# Patient Record
Sex: FEMALE | Race: WHITE | HISPANIC OR LATINO | Employment: FULL TIME | URBAN - METROPOLITAN AREA
[De-identification: names, ages, dates, MRNs, and addresses within clinical notes are randomized per-mention and may not be internally consistent; named-entity substitution may affect disease eponyms.]

---

## 2017-03-13 ENCOUNTER — HOSPITAL ENCOUNTER (OUTPATIENT)
Dept: GENERAL RADIOLOGY | Facility: HOSPITAL | Age: 42
Discharge: HOME | End: 2017-03-13
Attending: INTERNAL MEDICINE | Admitting: INTERNAL MEDICINE

## 2017-03-13 ENCOUNTER — TRANSCRIBE ORDERS (OUTPATIENT)
Dept: ADMINISTRATIVE | Facility: HOSPITAL | Age: 42
End: 2017-03-13

## 2017-03-13 DIAGNOSIS — M54.10 RADICULAR LOW BACK PAIN: Primary | ICD-10-CM

## 2017-03-13 DIAGNOSIS — M54.10 RADICULAR LOW BACK PAIN: ICD-10-CM

## 2017-03-13 PROCEDURE — 72120 X-RAY BEND ONLY L-S SPINE: CPT

## 2017-06-28 RX ORDER — HYDROCODONE BITARTRATE AND ACETAMINOPHEN 5; 325 MG/1; MG/1
1 TABLET ORAL EVERY 6 HOURS PRN
COMMUNITY

## 2017-06-28 RX ORDER — CHLORAL HYDRATE 500 MG
CAPSULE ORAL
COMMUNITY

## 2017-06-28 RX ORDER — LORATADINE 10 MG/1
10 TABLET ORAL DAILY
COMMUNITY

## 2017-06-28 RX ORDER — NORETHINDRONE ACETATE AND ETHINYL ESTRADIOL 1; 5 MG/1; UG/1
TABLET ORAL DAILY
COMMUNITY

## 2017-06-29 ENCOUNTER — OFFICE VISIT (OUTPATIENT)
Dept: NEUROSURGERY | Facility: CLINIC | Age: 42
End: 2017-06-29

## 2017-06-29 VITALS
WEIGHT: 242 LBS | HEIGHT: 68 IN | DIASTOLIC BLOOD PRESSURE: 83 MMHG | SYSTOLIC BLOOD PRESSURE: 143 MMHG | BODY MASS INDEX: 36.68 KG/M2 | HEART RATE: 66 BPM

## 2017-06-29 DIAGNOSIS — M54.16 LUMBAR RADICULOPATHY: Primary | ICD-10-CM

## 2017-06-29 PROCEDURE — 99243 OFF/OP CNSLTJ NEW/EST LOW 30: CPT | Performed by: NEUROLOGICAL SURGERY

## 2017-06-29 RX ORDER — DICLOFENAC SODIUM 75 MG/1
TABLET, DELAYED RELEASE ORAL
COMMUNITY
Start: 2017-05-09

## 2017-06-29 NOTE — PROGRESS NOTES
Subjective   Patient ID: Elisha Cabral is a 41 y.o. female is being seen for consultation today at the request of Dr. Kristin Cook for back pain that radiates into right lower leg She also has right leg weakness. She currently takes Diclofenac for pain.    History of Present Illness    This patient has been having pain in her back for many years.  The pain will wax and wane but is often times quite severe.  It ranges from her lower back up into her thoracic spine.  She had not really had much pain into her legs until earlier this year when she began to have some radiation into her right leg.  The pain radiates into her right leg in the right lateral calf.  She really doesn't have much pain in her thigh at all.  The left leg is okay and she has no difficulty with bowel and bladder control or other associated symptoms.  The pain is worse with activity.  She did go on vacation not too long ago and when she came back some of the pain in her right lateral leg was improved.     The following portions of the patient's history were reviewed and updated as appropriate: allergies, current medications, past family history, past medical history, past social history, past surgical history and problem list.    Review of Systems   Constitutional: Positive for activity change.   Respiratory: Negative for chest tightness and shortness of breath.    Cardiovascular: Negative for chest pain.   Musculoskeletal: Positive for back pain. Negative for myalgias.        Right lower extremity pain   Neurological: Positive for weakness. Negative for numbness.   All other systems reviewed and are negative.      Objective   Physical Exam   Constitutional: She is oriented to person, place, and time. She appears well-developed and well-nourished.   HENT:   Head: Normocephalic and atraumatic.   Eyes: Conjunctivae and EOM are normal. Pupils are equal, round, and reactive to light.   Fundoscopic exam:       The right eye shows no papilledema. The right  eye shows venous pulsations.        The left eye shows no papilledema. The left eye shows venous pulsations.   Neck: Carotid bruit is not present.   Neurological: She is oriented to person, place, and time. She has a normal Finger-Nose-Finger Test and a normal Heel to Shin Test. Gait normal.   Reflex Scores:       Tricep reflexes are 2+ on the right side and 2+ on the left side.       Bicep reflexes are 2+ on the right side and 2+ on the left side.       Brachioradialis reflexes are 2+ on the right side and 2+ on the left side.       Patellar reflexes are 2+ on the right side and 2+ on the left side.       Achilles reflexes are 2+ on the right side and 2+ on the left side.  Psychiatric: Her speech is normal.     Neurologic Exam     Mental Status   Oriented to person, place, and time.   Registration of memory: Good recent and remote memory.   Attention: normal. Concentration: normal.   Speech: speech is normal   Level of consciousness: alert  Knowledge: consistent with education.     Cranial Nerves     CN II   Visual fields full to confrontation.   Visual acuity: normal    CN III, IV, VI   Pupils are equal, round, and reactive to light.  Extraocular motions are normal.     CN V   Facial sensation intact.   Right corneal reflex: normal  Left corneal reflex: normal    CN VII   Facial expression full, symmetric.   Right facial weakness: none  Left facial weakness: none    CN VIII   Hearing: intact    CN IX, X   Palate: symmetric    CN XI   Right sternocleidomastoid strength: normal  Left sternocleidomastoid strength: normal    CN XII   Tongue: not atrophic  Tongue deviation: none    Motor Exam   Muscle bulk: normal  Right arm tone: normal  Left arm tone: normal  Right leg tone: normal  Left leg tone: normal    Strength   Strength 5/5 except as noted.     Sensory Exam   Light touch normal.     Gait, Coordination, and Reflexes     Gait  Gait: normal    Coordination   Finger to nose coordination: normal  Heel to shin  coordination: normal    Reflexes   Right brachioradialis: 2+  Left brachioradialis: 2+  Right biceps: 2+  Left biceps: 2+  Right triceps: 2+  Left triceps: 2+  Right patellar: 2+  Left patellar: 2+  Right achilles: 2+  Left achilles: 2+  Right : 2+  Left : 2+      Assessment/Plan   Independent Review of Radiographic Studies:      I reviewed an MRI of her lumbar spine done [facility] May of this year.  This does show some disc bulging at T12-L1 to the right side but does not appear to be causing any pressure on the nerves.  L1 2 looks okay as does L2-3.  L3 4 shows some disc bulging centrally but no evidence of any significant pressure on the nerves.  L4 5 shows a right-sided disc herniation that is fairly large.  L5-S1 looks okay for the most part.  I also reviewed a thoracic MRI which for the most part looks okay.    Medical Decision Making:      I told the patient about the imaging.  I think that the disc at L4 5 may be contributing to the problems however she has had 2 previous lumbar surgeries and the findings at L4 5 and may be more scar tissue since the MRI was done without contrast.  Consequently I suggested that before we jump into anything too aggressive we go ahead and try some lumbar epidural blocks and some lumbar PT.  I will have her call me after that and let me now how she is doing.  If she is better than we will do nothing further but if she is not we will get her back into the office to discuss either a follow-up MRI or probably a myelogram.    Elisha was seen today for back pain and leg pain.    Diagnoses and all orders for this visit:    Lumbar radiculopathy  -     Epidural Block  -     Ambulatory Referral to Physical Therapy    Return for Recheck and call after treatment or consultation.

## 2017-07-14 ENCOUNTER — HOSPITAL ENCOUNTER (OUTPATIENT)
Dept: PHYSICAL THERAPY | Facility: HOSPITAL | Age: 42
Setting detail: THERAPIES SERIES
Discharge: HOME | End: 2017-07-14
Attending: NEUROLOGICAL SURGERY

## 2017-07-14 DIAGNOSIS — M54.41 CHRONIC MIDLINE LOW BACK PAIN WITH RIGHT-SIDED SCIATICA: Primary | ICD-10-CM

## 2017-07-14 DIAGNOSIS — G89.29 CHRONIC MIDLINE LOW BACK PAIN WITH RIGHT-SIDED SCIATICA: Primary | ICD-10-CM

## 2017-07-14 PROCEDURE — 97161 PT EVAL LOW COMPLEX 20 MIN: CPT

## 2017-07-14 PROCEDURE — 97012 MECHANICAL TRACTION THERAPY: CPT

## 2017-07-14 PROCEDURE — G8978 MOBILITY CURRENT STATUS: HCPCS

## 2017-07-14 PROCEDURE — G8979 MOBILITY GOAL STATUS: HCPCS

## 2017-07-14 NOTE — THERAPY EVALUATION
Outpatient Physical Therapy Ortho Initial Evaluation  [facility]     Patient Name: Elisha Cabral  : 1975  MRN: [id number]  Today's Date: 2017      Visit Date: 2017    Patient Active Problem List   Diagnosis   • Lumbar radiculopathy        History reviewed. No pertinent past medical history.     Past Surgical History:   Procedure Laterality Date   • BACK SURGERY     • KNEE SURGERY     • NOSE SURGERY         Visit Dx:     ICD-10-CM ICD-9-CM   1. Chronic midline low back pain with right-sided sciatica M54.41 724.2    G89.29 724.3     338.29             Patient History       17 1100          History    Chief Complaint Pain  -LS      Type of Pain Back pain  -LS      Date Current Problem(s) Began 17  -LS      Brief Description of Current Complaint Pt reports discectomy x 2 in  at unknown level. In March of this year began having R lateral leg pain, occasional warmth in R toes, R hip and midline R sided low back pain. It did have one episode of resolving in . On feet for 8 hours and week off led to resolved pain. 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  -LS      Previous treatment for THIS PROBLEM Surgery  -LS      Onset Date- PT 17  -LS      Surgery Date: 03  -LS      Patient/Caregiver Goals Relieve pain;Improve mobility;Improve strength  -LS      Hand Dominance right-handed  -LS      Occupation/sports/leisure activities Pt works at [employer]  -LS      Patient seeing anyone else for problem(s)? Yes  -LS      How has patient tried to help current problem? discectomy   -LS      What clinical tests have you had for this problem? MRI  -LS      Results of Clinical Tests L4/L5 disc herniation  -LS      Surgery/Hospitalization discectomy x2 2003  -LS      Pain     Pain Location Back  -LS      Pain at Present 4  -LS      Pain at Best 6  -LS      Pain at Worst 4  -LS      Pain Frequency Constant/continuous   -LS      Pain Description Aching;Shooting  -LS      What Performance Factors Make the Current Problem(s) WORSE? moving, lifting, standing  -LS      What Performance Factors Make the Current Problem(s) BETTER? resting, hooklying  -LS      Tolerance Time- Standing 5 minutes  -LS      Tolerance Time- Sitting 20 minutes  -LS      Tolerance Time- Walking 5 minutes  -LS      Tolerance Time- Lying 2 hours  -LS      Is your sleep disturbed? Yes  -LS      What position do you sleep in? Right sidelying  -LS      Difficulties at work? Yes, pt stands all day  -LS      Fall Risk Assessment    Any falls in the past year: No  -LS      Services    Prior Rehab/Home Health Experiences No  -LS      Daily Activities    Primary Language English  -LS      Pt Participated in POC and Goals Yes  -LS      Safety    Are you being hurt, hit, or frightened by anyone at home or in your life? No  -LS      Are you being neglected by a caregiver No  -LS        User Key  (r) = Recorded By, (t) = Taken By, (c) = Cosigned By    Initials Name Provider Type    LS Barb Stone, PT Physical Therapist                PT Ortho       07/14/17 1500    Posture/Observations    Posture/Observations Comments forward head, B rounded shoulders, B hip ER in stance and ambulation, dec lumbar lordosis  -LS    Sensation    Additional Comments dec sensation RLE lateral calf and lateral foot  -LS    Myotomal Screen- Lower Quarter Clearing    Hip flexion (L2) Right:;Left:;4+ (Good +);4 (Good)  -LS    Knee extension (L3) Bilateral:;5 (Normal)  -LS    Ankle DF (L4) Right:;Left:;5 (Normal);4+ (Good +)  -LS    Ankle PF (S1) Bilateral:;5 (Normal)  -LS    Knee flexion (S2) Right:;Left:;5 (Normal);4+ (Good +)  -LS    Lumbar ROM Screen- Lower Quarter Clearing    Lumbar Flexion Normal  -LS    Lumbar Extension Impaired   ROM WFL; painful extension  -LS    Lumbar Lateral Flexion Impaired   painful to L  -LS    Lumbar Rotation Impaired   painful to R  -LS    Lumbosacral Palpation     Lumbosacral Segment Tender  -LS    Piriformis Guarded/taut  -LS    Gluteus Phu Guarded/taut  -LS    Quadratus Lumborum Guarded/taut  -LS    Erector Spinae (Paraspinals) Guarded/taut  -LS    Lumbar/SI Special Tests    Stork Test (SI Dysfunction) Negative  -LS    Slump Test (Neural Tension) Positive;Right:  -LS    SLR (Neural Tension) Positive;Right:  -LS    SI Compression Test (SI Dysfunction) Negative  -LS    SI Distraction Test (SI Dysfunction) Negative  -LS    ABBEY (hip vs. SI Dysfunction) Positive;Bilateral:  -LS    ROM (Range of Motion)    General ROM Detail BLE WFL  -LS    MMT (Manual Muscle Testing)    General MMT Assessment Detail B hip abduction 4/5  -LS    Lower Extremity Flexibility    Hamstrings Bilateral:;WNL  -LS    Hip External Rotators Bilateral:;Mildly limited  -LS    Hip Internal Rotators Bilateral:;Mildly limited  -LS    Gastrocnemius Bilateral:;WNL  -LS    Gait Assessment/Treatment    Gait, [place] Level independent  -LS    Gait, Distance (Feet) 100  -LS    Gait, Comment dec B hip extension, B ER, slightly antalgic  -LS      User Key  (r) = Recorded By, (t) = Taken By, (c) = Cosigned By    Initials Name Provider Type    STEVO Stone PT Physical Therapist                            Therapy Education       07/14/17 1502          Therapy Education    Given HEP;Symptoms/condition management;Pain management  -LS      Program New  -LS      How Provided Verbal;Demonstration;Written  -LS      Provided to Patient  -LS      Level of Understanding Teach back education performed;Verbalized;Demonstrated  -LS        User Key  (r) = Recorded By, (t) = Taken By, (c) = Cosigned By    Initials Name Provider Type    STEVO Stone PT Physical Therapist                PT OP Goals       07/14/17 1500       PT Short Term Goals    STG Date to Achieve 07/28/17  -LS     STG 1 Pt will demonstrate understanding and compliance with initial HEP.  -LS     STG 1 Progress New  -LS     STG 2 Pt will report  reduced pain from 6/10 at worse to 4/10 or better with improved core activation and  improvement.  -LS     STG 2 Progress New  -LS     STG 3 Pt will report reduced occurence of R foot burning to demonstrate centralization of symptoms.  -LS     STG 3 Progress New  -LS     Long Term Goals    LTG Date to Achieve 08/13/17  -LS     LTG 1 Pt will report reduced overall disability evidenced by improved Oswestry disability score from 64% to less than or equal to 50% disability.  -LS     LTG 1 Progress New  -LS     LTG 2 Pt will report centralization of symptoms with symptoms to R hip or more proximal demonstrating healing.  -LS     LTG 2 Progress New  -LS     LTG 3 Pt will demonstrate improved R ankle DF and knee flexion strength from 4+/5 to 5/5 to equal LLE.   -LS     LTG 3 Progress New  -LS     Time Calculation    PT Goal Re-Cert Due Date 08/13/17  -LS       User Key  (r) = Recorded By, (t) = Taken By, (c) = Cosigned By    Initials Name Provider Type    LS Babr Stone, PT Physical Therapist                PT Assessment/Plan       07/14/17 7297       PT Assessment    Functional Limitations Limitations in community activities;Performance in sport activities;Impaired gait;Limitations in functional capacity and performance;Performance in work activities;Impaired locomotion;Performance in leisure activities;Limitation in home management;Performance in self-care ADL  -LS     Impairments Impaired reflex integrity;Muscle strength;Range of motion;Pain;Sensation;Joint integrity;Impaired muscle length;Gait;Impaired flexibility;Joint mobility;Poor body mechanics;Locomotion;Motor function;Posture  -LS     Assessment Comments Pt is 41 yo female who works on feet all day at [employer] with occasional heavy lifting requirements reporting several month hx of R sided LBP, hip pain, and radicular symptoms to lateral lower leg. She also has occasional burning feeling in R toes. She demonstrates poor posture with rounded  forward shoulders, B hip ER, and posterior pelvic tilt in standing. She has hx of discectomy x2 in 2003 with reduction of similar symptoms until recently following 50# weight loss. She demonstrates minimal strength deficits R > L with 4/5 hip flexion and 4+/5 knee flexion, ankle DF compared to LLE grossly 5/5. She demonstrates dec light touch sensation RLE lower leg, and lateral foot. B patellar and [place]'s reflexes were diminished (1+). She demonstrates normal ROM in all planes with slight deficit and pain in R rotation and L sidebending. Stork testing, SI compression/distraction were negative. LAD reduced RLE symptoms. SLR and Slump testing were positive on R. She has pain with transitional movements including rolling to R and L, supine to sit, and sit to stand. Hooklying position reduced her pain. She demonstrates signs and symptoms consistent with nerve root irritation. She will benefit from continued skilled PT services in order to improve core strength, improve BLE flexibility, and improve posture to reduce lumbar spine strain with daily tasks.   -LS     Please refer to paper survey for additional self-reported information Yes  -LS     Rehab Potential Good  -LS     Patient/caregiver participated in establishment of treatment plan and goals Yes  -LS     Patient would benefit from skilled therapy intervention Yes  -LS     PT Plan    PT Frequency 2x/week  -LS     Predicted Duration of Therapy Intervention (days/wks) 4 weeks   -LS     Planned CPT's? PT EVAL LOW COMPLEXITY: 69194;PT RE-EVAL: 88000;PT MANUAL THERAPY EA 15 MIN: 67905;PT HOT OR COLD PACK TREAT MCARE;PT ULTRASOUND EA 15 MIN: 60509;PT HOT/COLD PACK WC NONMCARE: 56772;PT THER PROC EA 15 MIN: 98802;PT THER ACT EA 15 MIN: 15517;PT TRACTION LUMBAR: 36407;PT ELECTRICAL STIM UNATTEND:   -LS     PT Plan Comments Assess response to lumbar traction. Continue BLE flexibility, core strengthening, body mechanics education, postural improvement.   -LS        User Key  (r) = Recorded By, (t) = Taken By, (c) = Cosigned By    Initials Name Provider Type    STEVO Stone PT Physical Therapist                Modalities       07/14/17 1500          Subjective Comments    Subjective Comments I lost 50# and then my back started hurting. It doesn't make sense.  -LS      Subjective Pain    Able to rate subjective pain? yes  -LS      Pre-Treatment Pain Level 4  -LS      Subjective Pain Comment When I had a week off work my back got much better.  -LS      Moist Heat    MH Applied Yes  -LS      Location lumbar spine during traction  -LS      Rx Minutes 10 mins  -LS      Traction 12167    Traction Type Lumbar  -LS      Rx Minutes 10  -LS      Duration Intermittent  -LS      Position Hook-lying  -LS      Weight 60   40#  -LS      Hold 40  -LS      Relax 10  -LS        User Key  (r) = Recorded By, (t) = Taken By, (c) = Cosigned By    Initials Name Provider Type    STEVO Stone PT Physical Therapist              Exercises       07/14/17 1500          Subjective Comments    Subjective Comments I lost 50# and then my back started hurting. It doesn't make sense.  -LS      Subjective Pain    Able to rate subjective pain? yes  -LS      Pre-Treatment Pain Level 4  -LS      Subjective Pain Comment When I had a week off work my back got much better.  -LS      Exercise 1    Exercise Name 1 SKTC  -LS      Exercise 2    Exercise Name 2 piriformis stretch  -LS      Exercise 3    Exercise Name 3 TA activation  -LS      Exercise 4    Exercise Name 4 decompression position  -LS      Exercise 5    Exercise Name 5 sciatic nerve glide  -LS        User Key  (r) = Recorded By, (t) = Taken By, (c) = Cosigned By    Initials Name Provider Type    STEVO Stone PT Physical Therapist                              Outcome Measures       07/14/17 1458          Functional Assessment    Outcome Measure Options Tereso Johns   64% disability  -LS        User Key  (r) = Recorded By, (t) = Taken By,  (c) = Cosigned By    Initials Name Provider Type    LS Barb Stone, PT Physical Therapist            Time Calculation:   Start Time: 1100  Stop Time: 1200  Time Calculation (min): 60 min     Therapy Charges for Today     Code Description Service Date Service Provider Modifiers Qty    74372000797 HC PT MOBILITY CURRENT 7/14/2017 Barb Stone, PT GP, CL 1    72387994363 HC PT MOBILITY PROJECTED 7/14/2017 Barb Stone, PT GP, CK 1    85449162356 HC PT EVAL LOW COMPLEXITY 3 7/14/2017 Barb Stone, PT GP 1    00198722558 HC PT HOT OR COLD PACK TREAT MCARE 7/14/2017 Barb Stone, PT GP 1    49325171123 HC PT TRACTION LUMBAR 7/14/2017 Barb Stone, PT GP 1          PT G-Codes  PT Professional Judgement Used?: Yes  Outcome Measure Options: Tereso Johns (64% disability)  Score: 64% disability  Functional Limitation: Mobility: Walking and moving around  Mobility: Walking and Moving Around Current Status (): At least 60 percent but less than 80 percent impaired, limited or restricted  Mobility: Walking and Moving Around Goal Status (): At least 40 percent but less than 60 percent impaired, limited or restricted         Barb Stone PT  7/14/2017

## 2017-07-18 ENCOUNTER — HOSPITAL ENCOUNTER (OUTPATIENT)
Dept: PHYSICAL THERAPY | Facility: HOSPITAL | Age: 42
Setting detail: THERAPIES SERIES
Discharge: HOME | End: 2017-07-18

## 2017-07-18 DIAGNOSIS — M54.41 CHRONIC MIDLINE LOW BACK PAIN WITH RIGHT-SIDED SCIATICA: Primary | ICD-10-CM

## 2017-07-18 DIAGNOSIS — G89.29 CHRONIC MIDLINE LOW BACK PAIN WITH RIGHT-SIDED SCIATICA: Primary | ICD-10-CM

## 2017-07-18 PROCEDURE — 97012 MECHANICAL TRACTION THERAPY: CPT

## 2017-07-18 PROCEDURE — 97110 THERAPEUTIC EXERCISES: CPT

## 2017-07-18 NOTE — THERAPY TREATMENT NOTE
Outpatient Physical Therapy Ortho Treatment Note  [facility]     Patient Name: Elisha Cabral  : 1975  MRN: [id number]  Today's Date: 2017      Visit Date: 2017    Visit Dx:    ICD-10-CM ICD-9-CM   1. Chronic midline low back pain with right-sided sciatica M54.41 724.2    G89.29 724.3     338.29       Patient Active Problem List   Diagnosis   • Lumbar radiculopathy        No past medical history on file.     Past Surgical History:   Procedure Laterality Date   • BACK SURGERY     • KNEE SURGERY     • NOSE SURGERY                               PT Assessment/Plan       17 1349       PT Assessment    Assessment Comments Pt returns for initial follow up after evaluation and reports no change in symptoms at this time. Pt reports short term relief after lumbar traction last visit and increased weight slightly today without adverse response. Added TA activation exercises, however pt had pain with quadruped alt UE and LE lift. No pain noted with UEs only with emphasis on TA contraction. Pt requires cueing to maintain core activation and neutral spine during exercises.   -CN     PT Plan    PT Plan Comments Continue with current POC. Assess response to lumbar traction and continue if beneficial.   -CN       User Key  (r) = Recorded By, (t) = Taken By, (c) = Cosigned By    Initials Name Provider Type    HOPE De Souza, ALPHONSO Physical Therapist                Modalities       17 1100          Moist Heat    MH Applied Yes  -CN      Location lumbar spine during traction  -CN      Rx Minutes 10 mins  -CN      Traction 51637    Traction Type Lumbar  -CN      Rx Minutes 10  -CN      Duration Intermittent  -CN      Position Hook-lying  -CN      Weight 65   /40  -CN      Hold 40  -CN      Relax 10  -CN        User Key  (r) = Recorded By, (t) = Taken By, (c) = Cosigned By    Initials Name Provider Type    HOPE De Souza, ALPHONSO Physical Therapist                Exercises        07/18/17 1200          Subjective Comments    Subjective Comments It felt ok after last time, it was really hurting yesteray though. The traction helped for a few minutes then the pain was back.   -CN      Subjective Pain    Able to rate subjective pain? yes  -CN      Pre-Treatment Pain Level 4  -CN      Post-Treatment Pain Level 3  -CN      Exercise 1    Exercise Name 1 SKTC  -CN      Cueing 1 Verbal  -CN      Reps 1 3  -CN      Time (Seconds) 1 20  -CN      Exercise 2    Exercise Name 2 piriformis stretch  -CN      Reps 2 3  -CN      Time (Seconds) 2 20  -CN      Exercise 3    Exercise Name 3 HL hip abduction with TA, B and uni  -CN      Cueing 3 Verbal  -CN      Reps 3 20  -CN      Exercise 6    Exercise Name 6 Cat camel  -CN      Cueing 6 Verbal  -CN      Reps 6 10  -CN      Exercise 7    Exercise Name 7 Nustep, L5 with UEs and TA  -CN      Time (Minutes) 7 5  -CN      Exercise 8    Exercise Name 8 Quadruped alt UE lift  -CN      Cueing 8 Verbal  -CN      Reps 8 10  -CN      Exercise 9    Exercise Name 9 Seated marches with TA on swiss ball  -CN      Cueing 9 Demo  -CN      Reps 9 15  -CN      Exercise 10    Exercise Name 10 Shoulder ext with TA, B and uni  -CN      Cueing 10 Demo  -CN      Reps 10 10  -CN        User Key  (r) = Recorded By, (t) = Taken By, (c) = Cosigned By    Initials Name Provider Type    HOPE De Souza, PT Physical Therapist                               PT OP Goals       07/18/17 1300       PT Short Term Goals    STG Date to Achieve 07/28/17  -CN     STG 1 Pt will demonstrate understanding and compliance with initial HEP.  -CN     STG 1 Progress Ongoing  -CN     STG 1 Progress Comments Pt reports compliance with HEP.   -CN     STG 2 Pt will report reduced pain from 6/10 at worse to 4/10 or better with improved core activation and  improvement.  -CN     STG 2 Progress Ongoing  -CN     STG 3 Pt will report reduced occurence of R foot burning to demonstrate  centralization of symptoms.  -CN     STG 3 Progress Ongoing  -CN     Long Term Goals    LTG Date to Achieve 08/13/17  -CN     LTG 1 Pt will report reduced overall disability evidenced by improved Oswestry disability score from 64% to less than or equal to 50% disability.  -CN     LTG 1 Progress Ongoing  -CN     LTG 2 Pt will report centralization of symptoms with symptoms to R hip or more proximal demonstrating healing.  -CN     LTG 2 Progress Ongoing  -CN     LTG 3 Pt will demonstrate improved R ankle DF and knee flexion strength from 4+/5 to 5/5 to equal LLE.   -CN     LTG 3 Progress Ongoing  -CN       User Key  (r) = Recorded By, (t) = Taken By, (c) = Cosigned By    Initials Name Provider Type    HOPE De Souza PT Physical Therapist                Therapy Education       07/18/17 1236          Therapy Education    Given HEP;Symptoms/condition management;Pain management  -CN      Program Reinforced  -CN      How Provided Verbal;Demonstration  -CN      Provided to Patient  -CN      Level of Understanding Teach back education performed;Verbalized;Demonstrated  -HOPE        User Key  (r) = Recorded By, (t) = Taken By, (c) = Cosigned By    Initials Name Provider Type    HOPE De Souza PT Physical Therapist                Time Calculation:   Start Time: 1230  Stop Time: 1315  Time Calculation (min): 45 min    Therapy Charges for Today     Code Description Service Date Service Provider Modifiers Qty    34725266814  PT THER PROC EA 15 MIN 7/18/2017 Paradise De Souza PT GP 2    [id number]  PT-TRACTION MECHANICAL 7/18/2017 Paradise De Souza PT  1    [id number]  PT HOT OR COLD PACK TREAT MCARE 7/18/2017 Paradise De Souza PT GP 1                    Paradise De Souza PT  7/18/2017

## 2017-07-20 ENCOUNTER — HOSPITAL ENCOUNTER (OUTPATIENT)
Dept: PHYSICAL THERAPY | Facility: HOSPITAL | Age: 42
Setting detail: THERAPIES SERIES
Discharge: HOME | End: 2017-07-20

## 2017-07-20 DIAGNOSIS — M54.41 CHRONIC MIDLINE LOW BACK PAIN WITH RIGHT-SIDED SCIATICA: Primary | ICD-10-CM

## 2017-07-20 DIAGNOSIS — G89.29 CHRONIC MIDLINE LOW BACK PAIN WITH RIGHT-SIDED SCIATICA: Primary | ICD-10-CM

## 2017-07-20 PROCEDURE — 97110 THERAPEUTIC EXERCISES: CPT | Performed by: PHYSICAL THERAPIST

## 2017-07-20 PROCEDURE — 97012 MECHANICAL TRACTION THERAPY: CPT | Performed by: PHYSICAL THERAPIST

## 2017-07-20 NOTE — THERAPY TREATMENT NOTE
Outpatient Physical Therapy Ortho Treatment Note  [facility]     Patient Name: Elisha Cabral  : 1975  MRN: [id number]  Today's Date: 2017      Visit Date: 2017    Visit Dx:    ICD-10-CM ICD-9-CM   1. Chronic midline low back pain with right-sided sciatica M54.41 724.2    G89.29 724.3     338.29       Patient Active Problem List   Diagnosis   • Lumbar radiculopathy        No past medical history on file.     Past Surgical History:   Procedure Laterality Date   • BACK SURGERY     • KNEE SURGERY     • NOSE SURGERY                               PT Assessment/Plan       17 1030       PT Assessment    Assessment Comments No increased symptoms during therapeutic exercises today and complete resolution of symptoms during traction, negative symptoms into right LE after traction and reports 0/10 pain. May begin to incorporate extension, Susanne type exercises to assist with centralization of radicular symptoms. Noted very limited mobility in lumbar spine with extension exercises today.   -SC       User Key  (r) = Recorded By, (t) = Taken By, (c) = Cosigned By    Initials Name Provider Type    THALIA Taylor, PT Physical Therapist                Modalities       17 1030          Moist Heat    MH Applied Yes  -SC      Location lumbar spine during traction  -SC      Rx Minutes 15 mins  -SC      Traction 84995    Traction Type Lumbar  -SC      Rx Minutes 15  -SC      Position Hook-lying  -SC      Weight 70   /40  -SC      Hold 40  -SC      Relax 10  -SC        User Key  (r) = Recorded By, (t) = Taken By, (c) = Cosigned By    Initials Name Provider Type    THALIA Taylor, PT Physical Therapist                Exercises       17 1030          Subjective Comments    Subjective Comments the traction only helps for a few minutes. My leg doesn't hurt right now but it was hurting before I got here. Comes and goes that quick.   -SC      Subjective Pain    Able to rate  subjective pain? yes  -SC      Pre-Treatment Pain Level 4  -SC      Post-Treatment Pain Level 0  -SC      Exercise 1    Exercise Name 1 SKTC  -SC      Cueing 1 Verbal  -SC      Reps 1 3  -SC      Time (Seconds) 1 20  -SC      Exercise 2    Exercise Name 2 piriformis stretch  -SC      Reps 2 3  -SC      Time (Seconds) 2 20  -SC      Exercise 3    Exercise Name 3 HL hip abduction with TA, B and uni  -SC      Cueing 3 Verbal  -SC      Reps 3 20  -SC      Exercise 6    Exercise Name 6 Cat camel  -SC      Cueing 6 Verbal  -SC      Reps 6 10  -SC      Exercise 7    Exercise Name 7 Nustep, L5 with UEs and TA  -SC      Time (Minutes) 7 5  -SC      Exercise 10    Exercise Name 10 Shoulder ext with TA, B and uni  -SC      Cueing 10 Demo  -SC      Reps 10 15  -SC      Exercise 11    Exercise Name 11 PPT  -SC      Reps 11 10  -SC      Exercise 12    Exercise Name 12 prone on elbows  -SC      Reps 12 5  -SC      Time (Seconds) 12 10  -SC      Exercise 13    Exercise Name 13 prone press up  -SC      Reps 13 5  -SC      Time (Seconds) 13 10  -SC        User Key  (r) = Recorded By, (t) = Taken By, (c) = Cosigned By    Initials Name Provider Type    SC Mary Taylor PT Physical Therapist                                   Therapy Education       07/20/17 1030          Therapy Education    Given HEP;Symptoms/condition management;Pain management  -SC      Program Modified  -SC      How Provided Verbal;Demonstration  -SC      Provided to Patient  -SC      Level of Understanding Teach back education performed;Verbalized;Demonstrated  -SC        User Key  (r) = Recorded By, (t) = Taken By, (c) = Cosigned By    Initials Name Provider Type    SC Mary Taylor PT Physical Therapist                Time Calculation:   Start Time: 1030  Stop Time: 1125  Time Calculation (min): 55 min    Therapy Charges for Today     Code Description Service Date Service Provider Modifiers Qty    60063795001  PT THER PROC EA 15 MIN 7/20/2017 Mary  SORIN Taylor, PT GP 2    31497783176 HC PT TRACTION LUMBAR 7/20/2017 Mary Taylor, PT GP 1    [id number] HC PT HOT OR COLD PACK TREAT MCARE 7/20/2017 Mary Taylor, PT GP 1                    Mary Martinez, PT  7/20/2017

## 2017-07-25 ENCOUNTER — HOSPITAL ENCOUNTER (OUTPATIENT)
Dept: PAIN MEDICINE | Facility: HOSPITAL | Age: 42
End: 2017-07-25
Attending: NEUROLOGICAL SURGERY

## 2017-07-26 ENCOUNTER — HOSPITAL ENCOUNTER (OUTPATIENT)
Dept: PHYSICAL THERAPY | Facility: HOSPITAL | Age: 42
Setting detail: THERAPIES SERIES
Discharge: HOME | End: 2017-07-26

## 2017-07-26 DIAGNOSIS — G89.29 CHRONIC MIDLINE LOW BACK PAIN WITH RIGHT-SIDED SCIATICA: Primary | ICD-10-CM

## 2017-07-26 DIAGNOSIS — M54.41 CHRONIC MIDLINE LOW BACK PAIN WITH RIGHT-SIDED SCIATICA: Primary | ICD-10-CM

## 2017-07-26 PROCEDURE — 97012 MECHANICAL TRACTION THERAPY: CPT

## 2017-07-26 PROCEDURE — 97110 THERAPEUTIC EXERCISES: CPT

## 2017-07-26 NOTE — THERAPY TREATMENT NOTE
Outpatient Physical Therapy Ortho Treatment Note  [facility]     Patient Name: Elisha Cabral  : 1975  MRN: [id number]  Today's Date: 2017      Visit Date: 2017    Visit Dx:    ICD-10-CM ICD-9-CM   1. Chronic midline low back pain with right-sided sciatica M54.41 724.2    G89.29 724.3     338.29       Patient Active Problem List   Diagnosis   • Lumbar radiculopathy        No past medical history on file.     Past Surgical History:   Procedure Laterality Date   • BACK SURGERY     • KNEE SURGERY     • NOSE SURGERY                               PT Assessment/Plan       17 1623       PT Assessment    Assessment Comments Pt reports decreased symptoms and is performing HEP daily. Pt reports relief of pain for a 2-3 days after last session with increased weight on lumbar traction. Continued with extension exercises today including prone press ups and prone on elbows witout adverse effects.   -CN     PT Plan    PT Plan Comments Continue with traction and extension based exercises. Consider D1 extension at [employer] seated on swiss ball next visit.   -CN       User Key  (r) = Recorded By, (t) = Taken By, (c) = Cosigned By    Initials Name Provider Type    HOPE De Souza, PT Physical Therapist                Modalities       17 1500          Moist Heat    MH Applied Yes  -CN      Location lumbar spine during traction  -CN      Rx Minutes 15 mins  -CN      Traction 99272    Traction Type Lumbar  -CN      Rx Minutes 15  -CN      Duration Intermittent  -CN      Position Hook-lying  -CN      Weight 70   /40  -CN      Hold 40  -CN      Relax 10  -CN        User Key  (r) = Recorded By, (t) = Taken By, (c) = Cosigned By    Initials Name Provider Type    HOPE De Souza, PT Physical Therapist                Exercises       17 1500          Subjective Comments    Subjective Comments I feel like it is getting better. The exercises are defiinitely helping.   -CN       Subjective Pain    Able to rate subjective pain? yes  -CN      Pre-Treatment Pain Level 3  -CN      Post-Treatment Pain Level 0  -CN      Exercise 1    Exercise Name 1 SKTC  -CN      Cueing 1 Verbal  -CN      Reps 1 3  -CN      Time (Seconds) 1 20  -CN      Exercise 2    Exercise Name 2 piriformis stretch  -CN      Reps 2 3  -CN      Time (Seconds) 2 20  -CN      Exercise 3    Exercise Name 3 HL hip abduction with TA, B and uni  -CN      Cueing 3 Verbal  -CN      Reps 3 20  -CN      Exercise 7    Exercise Name 7 Nustep, L5 with UEs and TA  -CN      Time (Minutes) 7 5  -CN      Exercise 11    Exercise Name 11 PPT  -CN      Reps 11 10  -CN      Exercise 12    Exercise Name 12 prone on elbows  -CN      Reps 12 5  -CN      Time (Seconds) 12 10  -CN      Exercise 13    Exercise Name 13 prone press up  -CN      Reps 13 5  -CN      Time (Seconds) 13 10  -CN        User Key  (r) = Recorded By, (t) = Taken By, (c) = Cosigned By    Initials Name Provider Type    HOPE De Souza, PT Physical Therapist                               PT OP Goals       07/26/17 1600       PT Short Term Goals    STG Date to Achieve 07/28/17  -CN     STG 1 Pt will demonstrate understanding and compliance with initial HEP.  -CN     STG 1 Progress Met  -CN     STG 2 Pt will report reduced pain from 6/10 at worse to 4/10 or better with improved core activation and  improvement.  -CN     STG 2 Progress Progressing  -CN     STG 2 Progress Comments Pt reports pain rated 3/10 today.   -CN     STG 3 Pt will report reduced occurence of R foot burning to demonstrate centralization of symptoms.  -CN     STG 3 Progress Ongoing  -CN     Long Term Goals    LTG Date to Achieve 08/13/17  -CN     LTG 1 Pt will report reduced overall disability evidenced by improved Oswestry disability score from 64% to less than or equal to 50% disability.  -CN     LTG 1 Progress Ongoing  -CN     LTG 2 Pt will report centralization of symptoms with  symptoms to R hip or more proximal demonstrating healing.  -CN     LTG 2 Progress Ongoing  -CN     LTG 3 Pt will demonstrate improved R ankle DF and knee flexion strength from 4+/5 to 5/5 to equal LLE.   -CN     LTG 3 Progress Ongoing  -CN       User Key  (r) = Recorded By, (t) = Taken By, (c) = Cosigned By    Initials Name Provider Type    HOPE De Souza PT Physical Therapist                Therapy Education       07/26/17 1602          Therapy Education    Given HEP;Symptoms/condition management;Pain management  -CN      Program Reinforced  -CN      How Provided Verbal;Demonstration  -CN      Provided to Patient  -CN      Level of Understanding Teach back education performed;Verbalized;Demonstrated  -CN        User Key  (r) = Recorded By, (t) = Taken By, (c) = Cosigned By    Initials Name Provider Type    HOPE De Souza, ALPHONSO Physical Therapist                Time Calculation:   Start Time: 1545  Stop Time: 1630  Time Calculation (min): 45 min    Therapy Charges for Today     Code Description Service Date Service Provider Modifiers Qty    01965928293  PT THER PROC EA 15 MIN 7/26/2017 Paradise De Souza, PT GP 2    [id number]  PT HOT OR COLD PACK TREAT MCARE 7/26/2017 Paradise De Souza, PT GP 1    [id number]  PT-TRACTION MECHANICAL 7/26/2017 Paradise De Souza, PT  1                    Paradise De Souza, PT  7/26/2017

## 2017-07-28 ENCOUNTER — HOSPITAL ENCOUNTER (OUTPATIENT)
Dept: PHYSICAL THERAPY | Facility: HOSPITAL | Age: 42
Setting detail: THERAPIES SERIES
Discharge: HOME | End: 2017-07-28

## 2017-07-28 DIAGNOSIS — G89.29 CHRONIC MIDLINE LOW BACK PAIN WITH RIGHT-SIDED SCIATICA: Primary | ICD-10-CM

## 2017-07-28 DIAGNOSIS — M54.41 CHRONIC MIDLINE LOW BACK PAIN WITH RIGHT-SIDED SCIATICA: Primary | ICD-10-CM

## 2017-07-28 PROCEDURE — 97012 MECHANICAL TRACTION THERAPY: CPT | Performed by: PHYSICAL THERAPIST

## 2017-07-28 PROCEDURE — 97110 THERAPEUTIC EXERCISES: CPT | Performed by: PHYSICAL THERAPIST

## 2017-07-28 NOTE — THERAPY TREATMENT NOTE
Outpatient Physical Therapy Ortho Treatment Note  [facility]     Patient Name: Elisha Cabral  : 1975  MRN: [id number]  Today's Date: 2017      Visit Date: 2017    Visit Dx:    ICD-10-CM ICD-9-CM   1. Chronic midline low back pain with right-sided sciatica M54.41 724.2    G89.29 724.3     338.29       Patient Active Problem List   Diagnosis   • Lumbar radiculopathy        No past medical history on file.     Past Surgical History:   Procedure Laterality Date   • BACK SURGERY     • KNEE SURGERY     • NOSE SURGERY                               PT Assessment/Plan       17 1805       PT Assessment    Assessment Comments Pt. able to tolerate progressing core work today. Increased pain immediately after traction, will assess long-term response.   -KJ     PT Plan    PT Plan Comments Assess response to progressed traction. Continue core strengthening.   -KJ       User Key  (r) = Recorded By, (t) = Taken By, (c) = Cosigned By    Initials Name Provider Type    OCTAVIO Scott, PT Physical Therapist                Modalities       17 1100          Moist Heat    Location lumbar spine during traction  -KJ      Rx Minutes 15 mins  -KJ      Traction 65937    Traction Type Lumbar  -KJ      Rx Minutes 15  -KJ      Position Hook-lying  -KJ      Weight 75   /40  -KJ      Hold 45  -KJ      Relax 10  -KJ        User Key  (r) = Recorded By, (t) = Taken By, (c) = Cosigned By    Initials Name Provider Type    OCTAVIO Scott, PT Physical Therapist                Exercises       17 1100          Subjective Comments    Subjective Comments Still doing better, 4-5/10, more achey. It's worse in mornings, better as I get moving and as day goes on. I'm not back to my baseline but it's better than it was when it was super severe. Most pain now in low back, occassionally down leg.   -KJ      Subjective Pain    Pre-Treatment Pain Level 5  -KJ      Exercise 2    Exercise Name 2  "piriformis stretch  -KJ      Reps 2 3  -KJ      Time (Seconds) 2 20  -KJ      Exercise 3    Exercise Name 3 HL hip abduction with TA, B and uni  -KJ      Cueing 3 Verbal  -KJ      Reps 3 20  -KJ      Exercise 4    Exercise Name 4 D1 \"chop\" on ball with Tuff Stuff to R and L  -KJ      Reps 4 10   ea  -KJ      Additional Comments Plate 2  -KJ      Exercise 6    Exercise Name 6 Cat camel  -KJ      Cueing 6 Verbal  -KJ      Reps 6 10  -KJ      Exercise 7    Exercise Name 7 Nustep, L5 with UEs and TA  -KJ      Time (Minutes) 7 5  -KJ      Exercise 8    Exercise Name 8 --  -KJ      Cueing 8 --  -KJ      Reps 8 --  -KJ      Exercise 9    Exercise Name 9 TA with marching in hooklying  -KJ      Reps 9 10   ea  -KJ      Exercise 10    Exercise Name 10 Shoulder ext with TA, B and uni  -KJ      Cueing 10 Demo  -KJ      Reps 10 15  -KJ      Exercise 11    Exercise Name 11 PPT  -KJ      Reps 11 15  -KJ      Exercise 12    Exercise Name 12 prone on elbows  -KJ      Reps 12 5  -KJ      Time (Seconds) 12 10  -KJ      Exercise 13    Exercise Name 13 prone press up  -KJ      Reps 13 5  -KJ      Time (Seconds) 13 10  -KJ      Exercise 14    Exercise Name 14 Modified bridging  -KJ      Reps 14 10  -KJ        User Key  (r) = Recorded By, (t) = Taken By, (c) = Cosigned By    Initials Name Provider Type    KJ Lavinia Scott, PT Physical Therapist                                   Therapy Education       07/28/17 9652          Therapy Education    Education Details At length discussion regarding lumbar HNP, disc symptoms, prevention and maintenance of improvement by continuing HEP, purpose and goals of prone press ups.   -KJ      Given HEP;Symptoms/condition management;Pain management;Posture/body mechanics  -KJ      Program Reinforced  -KJ      How Provided Verbal;Demonstration  -KJ      Provided to Patient  -KJ      Level of Understanding Teach back education performed;Verbalized;Demonstrated  -KJ        User Key  (r) = Recorded By, " (t) = Taken By, (c) = Cosigned By    Initials Name Provider Type    KJ Lavinia Scott, PT Physical Therapist                Time Calculation:   Start Time: 1130  Stop Time: 1225  Time Calculation (min): 55 min    Therapy Charges for Today     Code Description Service Date Service Provider Modifiers Qty    28990489493  PT THER PROC EA 15 MIN 7/28/2017 Lavinia Scott, PT GP 3    [id number]  PT TRACTION LUMBAR 7/28/2017 Lavinia Scott, PT GP 1    [id number]  PT HOT OR COLD PACK TREAT MCARE 7/28/2017 Lavinia Scott, PT GP 1                    Lavinia Scott, PT  7/28/2017

## 2017-07-31 ENCOUNTER — APPOINTMENT (OUTPATIENT)
Dept: PHYSICAL THERAPY | Facility: HOSPITAL | Age: 42
End: 2017-07-31

## 2017-08-02 ENCOUNTER — HOSPITAL ENCOUNTER (OUTPATIENT)
Dept: PHYSICAL THERAPY | Facility: HOSPITAL | Age: 42
Setting detail: THERAPIES SERIES
Discharge: HOME | End: 2017-08-02

## 2017-08-02 DIAGNOSIS — M54.41 CHRONIC MIDLINE LOW BACK PAIN WITH RIGHT-SIDED SCIATICA: Primary | ICD-10-CM

## 2017-08-02 DIAGNOSIS — G89.29 CHRONIC MIDLINE LOW BACK PAIN WITH RIGHT-SIDED SCIATICA: Primary | ICD-10-CM

## 2017-08-02 PROCEDURE — 97110 THERAPEUTIC EXERCISES: CPT

## 2017-08-02 PROCEDURE — 97012 MECHANICAL TRACTION THERAPY: CPT

## 2017-08-02 NOTE — THERAPY TREATMENT NOTE
Outpatient Physical Therapy Ortho Treatment Note  [facility]     Patient Name: Elisha Cabral  : 1975  MRN: [id number]  Today's Date: 2017      Visit Date: 2017    Visit Dx:    ICD-10-CM ICD-9-CM   1. Chronic midline low back pain with right-sided sciatica M54.41 724.2    G89.29 724.3     338.29       Patient Active Problem List   Diagnosis   • Lumbar radiculopathy        No past medical history on file.     Past Surgical History:   Procedure Laterality Date   • BACK SURGERY     • KNEE SURGERY     • NOSE SURGERY                               PT Assessment/Plan       17 1501       PT Assessment    Assessment Comments Pt reports increased pain after lumbar traction last visit, however reduced pain levels since then (3/10 at worst). Continued with traction today using slightly less weight without adverse reaction. Pt able to perform core and hip strengthening exercises with minimal cueing and is independent with current HEP.   -CN     PT Plan    PT Plan Comments Continue with core strenghtening and assess response to traction.   -CN       User Key  (r) = Recorded By, (t) = Taken By, (c) = Cosigned By    Initials Name Provider Type    HOPE De Souza, PT Physical Therapist                Modalities       17 1400          Moist Heat    MH Applied Yes  -CN      Location lumbar spine during traction  -CN      Rx Minutes 15 mins  -CN      Traction 84263    Traction Type Lumbar  -CN      Rx Minutes 15  -CN      Duration Intermittent  -CN      Position Hook-lying  -CN      Weight 70   /40  -CN      Hold 45  -CN      Relax 10  -CN        User Key  (r) = Recorded By, (t) = Taken By, (c) = Cosigned By    Initials Name Provider Type    HOPE De Souza, PT Physical Therapist                Exercises       17 1400          Subjective Comments    Subjective Comments The traction last time was not comfortable and I felt weak afterwards. I have felt really good  "since last time though.   -CN      Subjective Pain    Able to rate subjective pain? yes  -CN      Pre-Treatment Pain Level 3  -CN      Post-Treatment Pain Level 0  -CN      Exercise 2    Exercise Name 2 piriformis stretch  -CN      Reps 2 3  -CN      Time (Seconds) 2 20  -CN      Exercise 3    Exercise Name 3 HL hip abduction with TA, B and uni  -CN      Cueing 3 Verbal  -CN      Reps 3 20  -CN      Additional Comments GTB  -CN      Exercise 4    Exercise Name 4 D1 \"chop\" on ball with Tuff Stuff to R and L  -CN      Reps 4 10   ea  -CN      Additional Comments Plate 2  -CN      Exercise 6    Exercise Name 6 Cat camel  -CN      Cueing 6 Verbal  -CN      Reps 6 10  -CN      Exercise 7    Exercise Name 7 Nustep, L5 with UEs and TA  -CN      Time (Minutes) 7 5  -CN      Exercise 9    Exercise Name 9 TA with marching in hooklying  -CN      Reps 9 10   ea  -CN      Exercise 10    Exercise Name 10 Shoulder ext with TA, B and uni  -CN      Cueing 10 Demo  -CN      Reps 10 15  -CN      Exercise 11    Exercise Name 11 PPT  -CN      Reps 11 15  -CN      Exercise 12    Exercise Name 12 prone on elbows  -CN      Reps 12 5  -CN      Time (Seconds) 12 10  -CN      Exercise 13    Exercise Name 13 prone press up  -CN      Reps 13 5  -CN      Time (Seconds) 13 10  -CN      Exercise 14    Exercise Name 14 Modified bridging  -CN      Reps 14 10  -CN        User Key  (r) = Recorded By, (t) = Taken By, (c) = Cosigned By    Initials Name Provider Type    CN Paradise De Souza, PT Physical Therapist                               PT OP Goals       08/02/17 1400       PT Short Term Goals    STG Date to Achieve 07/28/17  -CN     STG 1 Pt will demonstrate understanding and compliance with initial HEP.  -CN     STG 1 Progress Met  -CN     STG 2 Pt will report reduced pain from 6/10 at worse to 4/10 or better with improved core activation and  improvement.  -CN     STG 2 Progress Progressing  -CN     STG 2 Progress Comments Over " past week, pain at worst has been 3/10.   -CN     STG 3 Pt will report reduced occurence of R foot burning to demonstrate centralization of symptoms.  -CN     STG 3 Progress Ongoing  -CN     Long Term Goals    LTG Date to Achieve 08/13/17  -CN     LTG 1 Pt will report reduced overall disability evidenced by improved Oswestry disability score from 64% to less than or equal to 50% disability.  -CN     LTG 1 Progress Ongoing  -CN     LTG 2 Pt will report centralization of symptoms with symptoms to R hip or more proximal demonstrating healing.  -CN     LTG 2 Progress Ongoing  -CN     LTG 3 Pt will demonstrate improved R ankle DF and knee flexion strength from 4+/5 to 5/5 to equal LLE.   -CN     LTG 3 Progress Ongoing  -CN       User Key  (r) = Recorded By, (t) = Taken By, (c) = Cosigned By    Initials Name Provider Type    HOPE De Souza PT Physical Therapist                Therapy Education       08/02/17 1426          Therapy Education    Given HEP;Symptoms/condition management;Pain management;Posture/body mechanics  -CN      Program Reinforced  -CN      How Provided Verbal;Demonstration  -CN      Provided to Patient  -CN      Level of Understanding Teach back education performed;Verbalized;Demonstrated  -CN        User Key  (r) = Recorded By, (t) = Taken By, (c) = Cosigned By    Initials Name Provider Type    HOPE De Souza PT Physical Therapist                Time Calculation:   Start Time: 1415  Stop Time: 1500  Time Calculation (min): 45 min    Therapy Charges for Today     Code Description Service Date Service Provider Modifiers Qty    00247932273 HC PT THER PROC EA 15 MIN 8/2/2017 Paradise De Souza, PT GP 2    98894264635 HC PT-TRACTION MECHANICAL 8/2/2017 Paradise De Souza, PT  1    [id number]  PT HOT OR COLD PACK TREAT MCARE 8/2/2017 Paradise De Souza, PT GP 1                    Paradise De Souza PT  8/2/2017

## 2017-08-07 ENCOUNTER — HOSPITAL ENCOUNTER (OUTPATIENT)
Dept: PHYSICAL THERAPY | Facility: HOSPITAL | Age: 42
Setting detail: THERAPIES SERIES
Discharge: HOME | End: 2017-08-07

## 2017-08-07 DIAGNOSIS — M54.41 CHRONIC MIDLINE LOW BACK PAIN WITH RIGHT-SIDED SCIATICA: Primary | ICD-10-CM

## 2017-08-07 DIAGNOSIS — G89.29 CHRONIC MIDLINE LOW BACK PAIN WITH RIGHT-SIDED SCIATICA: Primary | ICD-10-CM

## 2017-08-07 PROCEDURE — 97110 THERAPEUTIC EXERCISES: CPT

## 2017-08-07 PROCEDURE — 97012 MECHANICAL TRACTION THERAPY: CPT

## 2017-08-07 NOTE — THERAPY TREATMENT NOTE
Outpatient Physical Therapy Ortho Treatment Note  [facility]     Patient Name: Elisha Cabral  : 1975  MRN: [id number]  Today's Date: 2017      Visit Date: 2017    Visit Dx:    ICD-10-CM ICD-9-CM   1. Chronic midline low back pain with right-sided sciatica M54.41 724.2    G89.29 724.3     338.29       Patient Active Problem List   Diagnosis   • Lumbar radiculopathy        No past medical history on file.     Past Surgical History:   Procedure Laterality Date   • BACK SURGERY     • KNEE SURGERY     • NOSE SURGERY                               PT Assessment/Plan       17 5847       PT Assessment    Assessment Comments Pt reports continued relief with traction and HEP. Pt states that her pain levels have been low for past week and no sharp pain. Pt continues to have symptoms into R foot, however symptoms through thigh and calf have improved. Discussed possible D/C next visit if pt able to demonstrate independent symptom management with HEP.   -CN     PT Plan    PT Plan Comments Plan to D/C next visit if no increase in symptoms and if pt able to maintain therapy gains independently.   -CN       User Key  (r) = Recorded By, (t) = Taken By, (c) = Cosigned By    Initials Name Provider Type    HOPE De Souza, PT Physical Therapist                Modalities       17 1100          Moist Heat    MH Applied Yes  -CN      Location lumbar spine during traction  -CN      Rx Minutes 15 mins  -CN      Traction 56395    Traction Type Lumbar  -CN      Rx Minutes 15  -CN      Duration Intermittent  -CN      Position Hook-lying  -CN      Weight 70   /40  -CN      Hold 45  -CN      Relax 10  -CN        User Key  (r) = Recorded By, (t) = Taken By, (c) = Cosigned By    Initials Name Provider Type    HOPE De Souza, PT Physical Therapist                Exercises       17 1100          Subjective Comments    Subjective Comments It has been feeling good. Pretty low  pain levels overall, mostly between a 2-3/10.   -CN      Subjective Pain    Able to rate subjective pain? yes  -CN      Pre-Treatment Pain Level 3  -CN      Post-Treatment Pain Level 2  -CN      Exercise 2    Exercise Name 2 piriformis stretch  -CN      Reps 2 3  -CN      Time (Seconds) 2 20  -CN      Exercise 3    Exercise Name 3 HL hip abduction with TA, B and uni  -CN      Cueing 3 Verbal  -CN      Reps 3 20  -CN      Additional Comments GTB  -CN      Exercise 7    Exercise Name 7 Nustep, L5 with UEs and TA  -CN      Time (Minutes) 7 5  -CN      Exercise 9    Exercise Name 9 TA with marching in hooklying  -CN      Reps 9 10  -CN      Exercise 11    Exercise Name 11 PPT  -CN      Reps 11 15  -CN        User Key  (r) = Recorded By, (t) = Taken By, (c) = Cosigned By    Initials Name Provider Type    HOPE De Souza, PT Physical Therapist                               PT OP Goals       08/07/17 1100       PT Short Term Goals    STG Date to Achieve 07/28/17  -CN     STG 1 Pt will demonstrate understanding and compliance with initial HEP.  -CN     STG 1 Progress Met  -CN     STG 2 Pt will report reduced pain from 6/10 at worse to 4/10 or better with improved core activation and  improvement.  -CN     STG 2 Progress Met  -CN     STG 2 Progress Comments Pain at worst 4/10.  -CN     STG 3 Pt will report reduced occurence of R foot burning to demonstrate centralization of symptoms.  -CN     STG 3 Progress Progressing  -CN     STG 3 Progress Comments Pt continues to have impaired sensation in R foot, however pain/symptoms down leg are improved.   -CN     Long Term Goals    LTG Date to Achieve 08/13/17  -CN     LTG 1 Pt will report reduced overall disability evidenced by improved Oswestry disability score from 64% to less than or equal to 50% disability.  -CN     LTG 1 Progress Ongoing  -CN     LTG 2 Pt will report centralization of symptoms with symptoms to R hip or more proximal demonstrating  healing.  -CN     LTG 2 Progress Ongoing  -CN     LTG 3 Pt will demonstrate improved R ankle DF and knee flexion strength from 4+/5 to 5/5 to equal LLE.   -CN     LTG 3 Progress Ongoing  -CN       User Key  (r) = Recorded By, (t) = Taken By, (c) = Cosigned By    Initials Name Provider Type    HOPE De Souza PT Physical Therapist                Therapy Education       08/07/17 1133          Therapy Education    Given HEP;Symptoms/condition management;Pain management;Posture/body mechanics  -CN      Program Progressed  -CN      How Provided Verbal;Demonstration  -CN      Provided to Patient  -CN      Level of Understanding Teach back education performed;Verbalized;Demonstrated  -CN        User Key  (r) = Recorded By, (t) = Taken By, (c) = Cosigned By    Initials Name Provider Type    HOPE De Souza PT Physical Therapist                Time Calculation:   Start Time: 1115  Stop Time: 1200  Time Calculation (min): 45 min    Therapy Charges for Today     Code Description Service Date Service Provider Modifiers Qty    42769062841  PT THER PROC EA 15 MIN 8/7/2017 Paradise De Souza, PT GP 2    [id number]  PT-TRACTION MECHANICAL 8/7/2017 Paradise De Souza, PT  1    [id number]  PT HOT OR COLD PACK TREAT MCARE 8/7/2017 Paradise De Souza, PT GP 1                    Paradise De Souza, PT  8/7/2017

## 2017-08-08 ENCOUNTER — APPOINTMENT (OUTPATIENT)
Dept: PAIN MEDICINE | Facility: HOSPITAL | Age: 42
End: 2017-08-08
Attending: NEUROLOGICAL SURGERY

## 2017-08-09 ENCOUNTER — APPOINTMENT (OUTPATIENT)
Dept: PHYSICAL THERAPY | Facility: HOSPITAL | Age: 42
End: 2017-08-09

## 2017-08-16 ENCOUNTER — HOSPITAL ENCOUNTER (OUTPATIENT)
Dept: PHYSICAL THERAPY | Facility: HOSPITAL | Age: 42
Setting detail: THERAPIES SERIES
Discharge: HOME | End: 2017-08-16

## 2017-08-16 DIAGNOSIS — M54.41 CHRONIC MIDLINE LOW BACK PAIN WITH RIGHT-SIDED SCIATICA: Primary | ICD-10-CM

## 2017-08-16 DIAGNOSIS — G89.29 CHRONIC MIDLINE LOW BACK PAIN WITH RIGHT-SIDED SCIATICA: Primary | ICD-10-CM

## 2017-08-16 PROCEDURE — 97110 THERAPEUTIC EXERCISES: CPT

## 2017-08-16 NOTE — THERAPY PROGRESS REPORT/RE-CERT
Outpatient Physical Therapy Ortho Re-Assessment  [facility]     Patient Name: Elisha Cabral  : 1975  MRN: [id number]  Today's Date: 2017      Visit Date: 2017    Patient Active Problem List   Diagnosis   • Lumbar radiculopathy        No past medical history on file.     Past Surgical History:   Procedure Laterality Date   • BACK SURGERY     • KNEE SURGERY     • NOSE SURGERY         Visit Dx:     ICD-10-CM ICD-9-CM   1. Chronic midline low back pain with right-sided sciatica M54.41 724.2    G89.29 724.3     338.29                 PT Ortho       17 1400    Myotomal Screen- Lower Quarter Clearing    Ankle DF (L4) Bilateral:;5 (Normal)  -CN    Knee flexion (S2) Right:;5 (Normal);Left:;4+ (Good +)  -CN      User Key  (r) = Recorded By, (t) = Taken By, (c) = Cosigned By    Initials Name Provider Type    HOPE De Souza, PT Physical Therapist                            Therapy Education       17 1428          Therapy Education    Given HEP;Symptoms/condition management;Pain management;Posture/body mechanics  -CN      Program Reinforced  -CN      How Provided Verbal;Demonstration  -CN      Provided to Patient  -CN      Level of Understanding Teach back education performed;Verbalized;Demonstrated  -CN        User Key  (r) = Recorded By, (t) = Taken By, (c) = Cosigned By    Initials Name Provider Type    HOPE De Souza, PT Physical Therapist                PT OP Goals       17 1400       PT Short Term Goals    STG Date to Achieve 17  -CN     STG 1 Pt will demonstrate understanding and compliance with initial HEP.  -CN     STG 1 Progress Met  -CN     STG 2 Pt will report reduced pain from 6/10 at worse to 4/10 or better with improved core activation and  improvement.  -CN     STG 2 Progress Met  -CN     STG 3 Pt will report reduced occurence of R foot burning to demonstrate centralization of symptoms.  -CN     STG 3 Progress  Progressing  -CN     STG 3 Progress Comments Pt states that she feels symptoms when she inverts her foot. Continues to have tingling while driving. Pt states that symptoms in foot are about the same, but almost no symptoms down the leg except in the morning sometimes.   -CN     Long Term Goals    LTG Date to Achieve 08/13/17  -CN     LTG 1 Pt will report reduced overall disability evidenced by improved Oswestry disability score from 64% to less than or equal to 50% disability.  -CN     LTG 1 Progress Met  -CN     LTG 1 Progress Comments Pt scored 18% on the Oswestry where 0% is no disability.   -CN     LTG 2 Pt will report centralization of symptoms with symptoms to R hip or more proximal demonstrating healing.  -CN     LTG 2 Progress Partially Met  -CN     LTG 2 Progress Comments Pt reports continued numbness in R foot, however rest of symptoms in R LE are decreased except occasional thigh tingling in the morning.   -CN     LTG 3 Pt will demonstrate improved R ankle DF and knee flexion strength from 4+/5 to 5/5 to equal LLE.   -CN     LTG 3 Progress Partially Met  -CN     LTG 3 Progress Comments Pt's DF strength improved however slight weakness in knee flexion strength.   -CN       User Key  (r) = Recorded By, (t) = Taken By, (c) = Cosigned By    Initials Name Provider Type    HOPE De Souza, PT Physical Therapist                PT Assessment/Plan       08/16/17 1433       PT Assessment    Functional Limitations Performance in sport activities;Limitations in functional capacity and performance;Performance in work activities;Impaired locomotion;Limitation in home management;Performance in self-care ADL  -CN     Impairments Impaired reflex integrity;Muscle strength;Range of motion;Pain;Sensation;Joint integrity;Impaired muscle length;Gait;Impaired flexibility;Joint mobility;Locomotion;Motor function;Posture  -CN     Assessment Comments Pt has attended 8 skilled therapy sessions for treatment of low back  pain with radiation to R LE. Pt has improved greatly with PT and reports relief of symptoms into R LE, however continues to have numbness in R foot at times (with R foot inversion). Pt c/o occasional symptoms into R LE while standing in shower, however overall much better. Pt demonstrates increased awareness of body mechanics and is avoiding lifting heavy boxes at work. Reviewed lifting with pt and encouraged pt to perform squat with any lifting at home or at work. Pt continues to have pain with L SBing which reproduces pain into R LE. Discussed discharge with pt and pt is concerned that stiffness has increased over past few days. Did not perform traction today to assess if pt able to gain relief with exercises only. Pt will hold on scheduling further appointments and attempt to manage symptoms independently with HEP. Pt to call to reschedule within next 3 weeks if symptoms return.   -CN     Please refer to paper survey for additional self-reported information Yes  -CN     Rehab Potential Good  -CN     Patient/caregiver participated in establishment of treatment plan and goals Yes  -CN     Patient would benefit from skilled therapy intervention Yes  -CN     PT Plan    PT Frequency 1x/week  -CN     Predicted Duration of Therapy Intervention (days/wks) 1-2 weeks  -CN     Planned CPT's? PT RE-EVAL: 09164;PT THER PROC EA 15 MIN: 55823;PT THER ACT EA 15 MIN: 20624;PT MANUAL THERAPY EA 15 MIN: 60275;PT NEUROMUSC RE-EDUCATION EA 15 MIN: 96595;PT GAIT TRAINING EA 15 MIN: 45546;PT AQUATIC THERAPY EA 15 MIN: 13455;PT HOT OR COLD PACK TREAT MCARE;PT ELECTRICAL STIM UNATTEND: ;PT TRACTION LUMBAR: 87996  -CN     Physical Therapy Interventions (Optional Details) neuromuscular re-education;stretching;home exercise program;joint mobilization;lumbar stabilization;patient/family education;postural re-education;manual therapy techniques;modalities;ROM (Range of Motion);strengthening;gait training  -CN     PT Plan Comments PT to  trial D/C today and will call with any remaining questions or concerns.   -CN       User Key  (r) = Recorded By, (t) = Taken By, (c) = Cosigned By    Initials Name Provider Type    HOPE De Souza, ALPHONSO Physical Therapist                  Exercises       08/16/17 1400          Subjective Comments    Subjective Comments It has been feeling good. Still no sharp pain, but I do feel like I have gotten stiff over the past few days. I don't know if its the traction that is making that go away or the exercises.   -CN      Subjective Pain    Able to rate subjective pain? yes  -CN      Pre-Treatment Pain Level 2  -CN      Post-Treatment Pain Level 1  -CN      Exercise 1    Exercise Name 1 SKTC  -CN      Cueing 1 Verbal  -CN      Reps 1 3  -CN      Time (Seconds) 1 20  -CN      Exercise 2    Exercise Name 2 piriformis stretch  -CN      Reps 2 3  -CN      Time (Seconds) 2 20  -CN      Exercise 5    Exercise Name 5 Seated lumbar stretch  -CN      Cueing 5 Demo  -CN      Reps 5 3  -CN      Time (Seconds) 5 20  -CN      Exercise 7    Exercise Name 7 Nustep, L5 with UEs and TA  -CN      Time (Minutes) 7 6  -CN      Exercise 8    Exercise Name 8 LTR  -CN      Cueing 8 Verbal  -CN      Reps 8 20  -CN      Time (Seconds) 8 5  -CN      Exercise 11    Exercise Name 11 PPT  -CN      Reps 11 15  -CN        User Key  (r) = Recorded By, (t) = Taken By, (c) = Cosigned By    Initials Name Provider Type    HOPE De Souza PT Physical Therapist                              Outcome Measures       08/16/17 1500          Functional Assessment    Outcome Measure Options Modifed [place]   18% where 0% isno disability  -CN        User Key  (r) = Recorded By, (t) = Taken By, (c) = Cosigned By    Initials Name Provider Type    HOPE De Souza PT Physical Therapist            Time Calculation:   Start Time: 1415  Stop Time: 1500  Time Calculation (min): 45 min     Therapy Charges for Today     Code Description Service Date  Service Provider Modifiers Qty    33131193417 HC PT THER PROC EA 15 MIN 8/16/2017 Paradise De Souza, PT GP 3          PT G-Codes  Outcome Measure Options: Modifed [place] (18% where 0% isno disability)         Paradise De Souza, PT  8/16/2017

## 2017-08-28 ENCOUNTER — APPOINTMENT (OUTPATIENT)
Dept: WOMENS IMAGING | Facility: HOSPITAL | Age: 42
End: 2017-08-28

## 2017-08-28 PROCEDURE — 77067 SCR MAMMO BI INCL CAD: CPT | Performed by: RADIOLOGY

## 2018-01-16 ENCOUNTER — DOCUMENTATION (OUTPATIENT)
Dept: PHYSICAL THERAPY | Facility: HOSPITAL | Age: 43
End: 2018-01-16

## 2018-01-16 DIAGNOSIS — G89.29 CHRONIC MIDLINE LOW BACK PAIN WITH RIGHT-SIDED SCIATICA: Primary | ICD-10-CM

## 2018-01-16 DIAGNOSIS — M54.41 CHRONIC MIDLINE LOW BACK PAIN WITH RIGHT-SIDED SCIATICA: Primary | ICD-10-CM

## 2018-01-16 NOTE — THERAPY DISCHARGE NOTE
Outpatient Physical Therapy Discharge Summary         Patient Name: Elisha Cabral  : 1975  MRN: [id number]    Today's Date: 2018    Visit Dx:    ICD-10-CM ICD-9-CM   1. Chronic midline low back pain with right-sided sciatica M54.41 724.2    G89.29 724.3     338.29             PT OP Goals       18 1400       PT Short Term Goals    STG Date to Achieve 17  -CN     STG 1 Pt will demonstrate understanding and compliance with initial HEP.  -CN     STG 1 Progress Met  -CN     STG 2 Pt will report reduced pain from 6/10 at worse to 4/10 or better with improved core activation and  improvement.  -CN     STG 2 Progress Met  -CN     STG 3 Pt will report reduced occurence of R foot burning to demonstrate centralization of symptoms.  -CN     STG 3 Progress Not Met  -CN     Long Term Goals    LTG Date to Achieve 17  -CN     LTG 1 Pt will report reduced overall disability evidenced by improved Oswestry disability score from 64% to less than or equal to 50% disability.  -CN     LTG 1 Progress Met  -CN     LTG 2 Pt will report centralization of symptoms with symptoms to R hip or more proximal demonstrating healing.  -CN     LTG 2 Progress Partially Met  -CN     LTG 3 Pt will demonstrate improved R ankle DF and knee flexion strength from 4+/5 to 5/5 to equal LLE.   -CN     LTG 3 Progress Partially Met  -CN       User Key  (r) = Recorded By, (t) = Taken By, (c) = Cosigned By    Initials Name Provider Type    HOPE De Souza, PT Physical Therapist          OP PT Discharge Summary  Date of Discharge: 18  Reason for Discharge: Independent  Outcomes Achieved: Patient able to partially acheive established goals  Discharge Destination: Home with home program  Discharge Instructions: Pt attended 8 skilled therapy sessions for treatment of low back pain with radiation into R LE. Pt reports much improved symptoms and is D/C'ed to independent management through HEP.       Time  Calculation:                    Paradise De Souza, PT  1/16/2018

## 2018-12-20 ENCOUNTER — APPOINTMENT (OUTPATIENT)
Dept: WOMENS IMAGING | Facility: HOSPITAL | Age: 43
End: 2018-12-20

## 2018-12-20 PROCEDURE — 77067 SCR MAMMO BI INCL CAD: CPT | Performed by: RADIOLOGY

## 2020-05-13 ENCOUNTER — APPOINTMENT (OUTPATIENT)
Dept: WOMENS IMAGING | Facility: HOSPITAL | Age: 45
End: 2020-05-13

## 2020-05-13 PROCEDURE — 77067 SCR MAMMO BI INCL CAD: CPT | Performed by: RADIOLOGY

## 2021-05-17 ENCOUNTER — APPOINTMENT (OUTPATIENT)
Dept: WOMENS IMAGING | Facility: HOSPITAL | Age: 46
End: 2021-05-17

## 2021-05-17 PROCEDURE — 77067 SCR MAMMO BI INCL CAD: CPT | Performed by: RADIOLOGY

## 2022-05-23 ENCOUNTER — APPOINTMENT (OUTPATIENT)
Dept: WOMENS IMAGING | Facility: HOSPITAL | Age: 47
End: 2022-05-23

## 2022-05-23 PROCEDURE — 77067 SCR MAMMO BI INCL CAD: CPT | Performed by: RADIOLOGY

## 2022-05-23 PROCEDURE — 77063 BREAST TOMOSYNTHESIS BI: CPT | Performed by: RADIOLOGY

## 2023-05-31 ENCOUNTER — TRANSCRIBE ORDERS (OUTPATIENT)
Dept: ADMINISTRATIVE | Facility: HOSPITAL | Age: 48
End: 2023-05-31

## 2023-05-31 DIAGNOSIS — Z12.31 ENCOUNTER FOR SCREENING MAMMOGRAM FOR MALIGNANT NEOPLASM OF BREAST: Primary | ICD-10-CM

## 2025-05-22 ENCOUNTER — TELEPHONE (OUTPATIENT)
Dept: ORTHOPEDIC SURGERY | Facility: CLINIC | Age: 50
End: 2025-05-22

## 2025-05-22 ENCOUNTER — OFFICE VISIT (OUTPATIENT)
Dept: ORTHOPEDIC SURGERY | Facility: CLINIC | Age: 50
End: 2025-05-22
Payer: COMMERCIAL

## 2025-05-22 VITALS — WEIGHT: 219.8 LBS | BODY MASS INDEX: 33.31 KG/M2 | HEIGHT: 68 IN | TEMPERATURE: 98.4 F

## 2025-05-22 DIAGNOSIS — M17.11 PRIMARY OSTEOARTHRITIS OF RIGHT KNEE: ICD-10-CM

## 2025-05-22 DIAGNOSIS — R52 PAIN: Primary | ICD-10-CM

## 2025-05-22 RX ORDER — ROSUVASTATIN CALCIUM 10 MG/1
10 TABLET, COATED ORAL DAILY
COMMUNITY
Start: 2025-05-20

## 2025-05-22 RX ORDER — CYCLOBENZAPRINE HCL 10 MG
TABLET ORAL
COMMUNITY

## 2025-05-22 RX ORDER — METHYLPREDNISOLONE ACETATE 80 MG/ML
80 INJECTION, SUSPENSION INTRA-ARTICULAR; INTRALESIONAL; INTRAMUSCULAR; SOFT TISSUE
Status: COMPLETED | OUTPATIENT
Start: 2025-05-22 | End: 2025-05-22

## 2025-05-22 RX ORDER — METAXALONE 800 MG/1
TABLET ORAL EVERY 24 HOURS
COMMUNITY

## 2025-05-22 RX ORDER — SEMAGLUTIDE 0.25 MG/.5ML
INJECTION, SOLUTION SUBCUTANEOUS
COMMUNITY

## 2025-05-22 RX ORDER — AMLODIPINE BESYLATE 5 MG/1
5 TABLET ORAL DAILY
COMMUNITY

## 2025-05-22 RX ORDER — HYDROCODONE BITARTRATE AND ACETAMINOPHEN 10; 325 MG/1; MG/1
TABLET ORAL
COMMUNITY

## 2025-05-22 RX ORDER — TRAZODONE HYDROCHLORIDE 50 MG/1
TABLET ORAL EVERY 24 HOURS
COMMUNITY

## 2025-05-22 RX ORDER — RIBOFLAVIN (VITAMIN B2) 100 MG
1 TABLET ORAL 3 TIMES DAILY
COMMUNITY

## 2025-05-22 RX ORDER — PLECANATIDE 3 MG/1
1 TABLET ORAL DAILY
COMMUNITY

## 2025-05-22 RX ADMIN — METHYLPREDNISOLONE ACETATE 80 MG: 80 INJECTION, SUSPENSION INTRA-ARTICULAR; INTRALESIONAL; INTRAMUSCULAR; SOFT TISSUE at 11:26

## 2025-05-22 NOTE — TELEPHONE ENCOUNTER
Caller: Elisha Cabral    Relationship to patient: Self    Best call back number:    Patient is needing: PATIENT WOULD LIKE TO DO PHYSICAL THERAPY FOR KNEE AT [facility]   
Sure, just let her know when scheduling calls to get that set up she can let them know where she would like to go.  
normal...

## 2025-05-22 NOTE — PROGRESS NOTES
Patient: Elisha Cabral  YOB: 1975 49 y.o. female  Medical Record Number: [id number]    Chief Complaints:   Chief Complaint   Patient presents with   • Right Knee - Initial Evaluation       History of Present Illness:Elisha Cabral is a 49 y.o. female who presents as a new patient both myself as well as to the practice with complaints of worsening in right knee pain.  The patient reports she does have a history of issues with her right knee including ACL surgery and meniscus surgery.  Reports she had viscosupplementation in the past.  No recent scope therapy.  She denies any injury.  She is not able to take anti-inflammatories because of elevated kidney function    Allergies: No Known Allergies    Medications:   Current Outpatient Medications   Medication Sig Dispense Refill   • amLODIPine (NORVASC) 5 MG tablet Take 1 tablet by mouth Daily.     • calcium citrate-vitamin d (CITRACAL) 200-250 MG-UNIT tablet tablet Take  by mouth Daily.     • Cholecalciferol (VITAMIN D3) 5000 UNITS capsule capsule Take 1 capsule by mouth Daily.     • cyclobenzaprine (FLEXERIL) 10 MG tablet 1 TABLET AT BEDTIME AS NEEDED ORAL AT NIGHT 90 DAYS     • diclofenac (VOLTAREN) 75 MG EC tablet      • glucosamine-chondroitin 500-400 MG per tablet Take 1 tablet by mouth 3 (Three) Times a Day.     • HYDROcodone-acetaminophen (NORCO)  MG per tablet TAKE 1 TABLET BY MOUTH EVERY 12 HOURS AS NEEDED FOR 30 DAYS *MD ROSE 5/11/25     • loratadine (CLARITIN) 10 MG tablet Take 1 tablet by mouth Daily.     • metaxalone (SKELAXIN) 800 MG tablet Daily.     • norethindrone-ethinyl estradiol (JINTELI) 1-5 MG-MCG tablet Take  by mouth Daily.     • Omega-3 Fatty Acids (FISH OIL) 1000 MG capsule capsule Take  by mouth Daily With Breakfast.     • rosuvastatin (CRESTOR) 10 MG tablet Take 1 tablet by mouth Daily.     • traZODone (DESYREL) 50 MG tablet Daily.     • Trulance 3 MG tablet Take 1 tablet by mouth Daily.     • Wegovy 0.25 MG/0.5ML solution  "auto-injector INJECT 0.5 ML (0.25 MG TOTAL) UNDER THE SKIN 1 (ONE) TIME PER WEEK.     • HYDROcodone-acetaminophen (NORCO) 5-325 MG per tablet Take 1 tablet by mouth Every 6 (Six) Hours As Needed. (Patient not taking: Reported on 5/22/2025)       No current facility-administered medications for this visit.         The following portions of the patient's history were reviewed and updated as appropriate: allergies, current medications, past family history, past medical history, past social history, past surgical history and problem list.    Review of Systems:   14 point review of systems was performed. All systems negative except pertinent positives/negatives listed in HPI above    Physical Exam:   Vitals:    05/22/25 1055   Temp: 98.4 °F (36.9 °C)   Weight: 99.7 kg (219 lb 12.8 oz)   Height: 172.7 cm (68\")       General: A and O x 3, ASA, NAD   Skin clear no unusual lesions noted  Right knee patient has no appreciable effusion 116 degrees flexion neutral in extension positive Miriam negative Lockman calf soft and nontender       Radiology:  Xrays 3views (ap,lateral, sunrise) right knee were ordered and reviewed today secondary to increasing pain show bone-on-bone end-stage osteoarthritis with cyst and spur formation.  She does have previous hardware noted from ACL surgery.  No comparative views available    Assessment/Plan: EndStage osteoarthritis right knee with increasing pain    Patient and I discussed options including conservative measures versus total knee replacement, she would like to proceed with right knee cortisone injection, physical therapy, I will see her back for follow-up in 3 to 4 months she will let me know if her symptoms worsen we can further discuss surgical intervention    Large Joint Arthrocentesis: R knee  Date/Time: 5/22/2025 11:26 AM  Consent given by: patient  Site marked: site marked  Timeout: Immediately prior to procedure a time out was called to verify the correct patient, procedure, " equipment, support staff and site/side marked as required   Supporting Documentation  Indications: pain and joint swelling   Procedure Details  Location: knee - R knee  Preparation: Patient was prepped and draped in the usual sterile fashion  Needle size: 22 G  Approach: anterolateral  Medications administered: 80 mg methylPREDNISolone acetate 80 MG/ML; 2 mL lidocaine (cardiac)  Patient tolerance: patient tolerated the procedure well with no immediate complications           Brittany Stahl, APRN  5/22/2025

## 2025-08-26 ENCOUNTER — OFFICE VISIT (OUTPATIENT)
Dept: ORTHOPEDIC SURGERY | Facility: CLINIC | Age: 50
End: 2025-08-26
Payer: COMMERCIAL

## 2025-08-26 VITALS — TEMPERATURE: 97.8 F | BODY MASS INDEX: 33.92 KG/M2 | HEIGHT: 68 IN | WEIGHT: 223.8 LBS

## 2025-08-26 DIAGNOSIS — M17.11 PRIMARY OSTEOARTHRITIS OF RIGHT KNEE: Primary | ICD-10-CM

## 2025-08-26 RX ORDER — NITROFURANTOIN 25; 75 MG/1; MG/1
CAPSULE ORAL
COMMUNITY
Start: 2025-07-11

## 2025-08-26 RX ORDER — LUBIPROSTONE 0.02 MG/1
24 CAPSULE ORAL
COMMUNITY
Start: 2025-08-07 | End: 2026-08-07

## 2025-08-26 RX ORDER — LUBIPROSTONE 0.01 MG/1
CAPSULE ORAL
COMMUNITY
Start: 2025-07-17

## 2025-08-26 RX ORDER — PHENAZOPYRIDINE HYDROCHLORIDE 100 MG/1
100 TABLET, FILM COATED ORAL
COMMUNITY
Start: 2025-07-11

## 2025-08-26 RX ORDER — METHYLPREDNISOLONE ACETATE 80 MG/ML
80 INJECTION, SUSPENSION INTRA-ARTICULAR; INTRALESIONAL; INTRAMUSCULAR; SOFT TISSUE
Status: COMPLETED | OUTPATIENT
Start: 2025-08-26 | End: 2025-08-26

## 2025-08-26 RX ADMIN — METHYLPREDNISOLONE ACETATE 80 MG: 80 INJECTION, SUSPENSION INTRA-ARTICULAR; INTRALESIONAL; INTRAMUSCULAR; SOFT TISSUE at 10:45
